# Patient Record
Sex: FEMALE | Employment: OTHER
[De-identification: names, ages, dates, MRNs, and addresses within clinical notes are randomized per-mention and may not be internally consistent; named-entity substitution may affect disease eponyms.]

---

## 2017-03-17 ENCOUNTER — CONSULTATION (OUTPATIENT)
Age: 67
End: 2017-03-17

## 2017-03-17 NOTE — PATIENT DISCUSSION
Recommended against surgery for now given good vision and lack of impact on activities of daily living.

## 2017-03-17 NOTE — PATIENT DISCUSSION
The condition appears to be stable and no further therapy is indicated. Routine F/U is needed at this time.

## 2017-03-17 NOTE — PATIENT DISCUSSION
I have recommended that we continue to observe the membrane in the left eye. She is to call if she notices any changes in her vision.

## 2017-03-22 ASSESSMENT — VISUAL ACUITY
OD_CC: 20/20-1
OS_CC: 20/40+2

## 2017-03-22 ASSESSMENT — TONOMETRY
OD_IOP_MMHG: 14
OS_IOP_MMHG: 15

## 2019-01-04 ENCOUNTER — FOLLOW UP (OUTPATIENT)
Age: 69
End: 2019-01-04

## 2019-01-07 ASSESSMENT — VISUAL ACUITY
OS_PH: 20/30
OD_CC: 20/20-1
OS_CC: 20/40-2

## 2019-01-07 ASSESSMENT — TONOMETRY
OS_IOP_MMHG: 15
OD_IOP_MMHG: 16

## 2022-06-14 NOTE — PATIENT DISCUSSION
The cataracts are visually significant. Long discussion re: cataract surgery, gave pamphlet. RTC when ready for cataract surgery pre-op.

## 2022-06-20 NOTE — PATIENT DISCUSSION
RBA'S DISCUSSED, PATIENTUNDERSTANDS AND DESIRES TO PROCEED WITH  SURGERY. CONSENT READ AND SIGNED. PATIENT DESIRES STANDARD FOR DISTANCE OS.

## 2022-07-11 NOTE — PATIENT DISCUSSION
DOING WELL. CONTINUE DROPS AS DIRECTED. SPECS RX OFFERED. RX ARC IN SPECS TO MINIMIZE GLARE. RETURN FOR FOLLOW-UP AS SCHEDULED.

## 2022-07-11 NOTE — PATIENT DISCUSSION
RBA'S DISCUSSED, PATIENT UNDERSTANDS AND DESIRES TO PROCEED WITH SURGERY. CONSENT READ AND SIGNED. PATIENT DESIRES STANDARD FOR DISTANCE RIGHT EYE.

## 2022-07-11 NOTE — PATIENT DISCUSSION
The patient has noticed an improvement in their visual symptoms in the operative eye. The patient complains of decreased vision in the fellow eye when watching tv. It was explained to the patient that the decision to proceed with cataract surgery in the fellow eye is entirely a separate decision from the surgical eye. All of the same risks, benefits and alternatives ere reviewed with the patient again. The patient does feel the vision in the non-operative eye is limiting their daily activities and elects to proceed with cataract surgery in the RIGHT eye. Schedule cataract surgery/ pre op OD.

## 2022-08-31 NOTE — PATIENT DISCUSSION
DOING WELL. PATIENT MISTAKENLY ONLY USING KETOROLAC - NEVER OPENED/USED OFLOXACIN OR PRED ACETATE. NO INFLAMMATION ON EXAMINATION TODAY AND PATIENT ASYMPTOMATIC FOR REBOUND - CONTINUE KETOROLAC AS DIRECTED (BID X 1 WEEK, THEN QD X 1 WEEK, THEN DISCONTINUE; ADVISED OK IF BOTTLE RUNS OUT BEFORE TAPER COMPLETED). SPECS RX OFFERED. RX ARC IN SPECS TO MINIMIZE GLARE. RETURN FOR FOLLOW-UP AS SCHEDULED.

## 2022-09-06 ENCOUNTER — CONSULTATION/EVALUATION (OUTPATIENT)
Dept: URBAN - METROPOLITAN AREA CLINIC 21 | Facility: CLINIC | Age: 72
End: 2022-09-06

## 2022-09-06 DIAGNOSIS — H25.813: ICD-10-CM

## 2022-09-06 PROCEDURE — 92014 COMPRE OPH EXAM EST PT 1/>: CPT

## 2022-09-06 PROCEDURE — 92136 OPHTHALMIC BIOMETRY: CPT

## 2022-09-06 ASSESSMENT — KERATOMETRY
OS_AXISANGLE2_DEGREES: 130
OD_AXISANGLE_DEGREES: 016
OD_AXISANGLE2_DEGREES: 106
OD_K2POWER_DIOPTERS: 45.00
OD_K1POWER_DIOPTERS: 44.25
OS_K2POWER_DIOPTERS: 45.25
OS_AXISANGLE2_DEGREES: 121
OD_AXISANGLE_DEGREES: 180
OD_AXISANGLE2_DEGREES: 90
OD_K2POWER_DIOPTERS: 44.50
OS_AXISANGLE_DEGREES: 031
OD_AXISANGLE2_DEGREES: 52
OS_K2POWER_DIOPTERS: 45.00
OS_AXISANGLE_DEGREES: 040
OS_AXISANGLE_DEGREES: 042
OD_K1POWER_DIOPTERS: 44.50
OS_AXISANGLE2_DEGREES: 132
OS_K1POWER_DIOPTERS: 44.50
OD_AXISANGLE_DEGREES: 142

## 2022-09-06 ASSESSMENT — VISUAL ACUITY
OS_CC: 20/30
OU_CC: 20/20
OS_SC: 20/30-2
OU_SC: 20/30-2
OD_CC: 20/20
OD_SC: 20/200

## 2022-09-06 ASSESSMENT — TONOMETRY
OD_IOP_MMHG: 13
OS_IOP_MMHG: 12